# Patient Record
Sex: FEMALE | Race: BLACK OR AFRICAN AMERICAN | NOT HISPANIC OR LATINO | Employment: FULL TIME | URBAN - METROPOLITAN AREA
[De-identification: names, ages, dates, MRNs, and addresses within clinical notes are randomized per-mention and may not be internally consistent; named-entity substitution may affect disease eponyms.]

---

## 2023-06-04 DIAGNOSIS — N63.15 BREAST LUMP ON RIGHT SIDE AT 9 O'CLOCK POSITION: Primary | ICD-10-CM

## 2023-06-04 DIAGNOSIS — R92.1 BREAST CALCIFICATIONS: ICD-10-CM

## 2023-06-04 NOTE — PROGRESS NOTES
[facility]  MD Fran García, NP-C    Chief Complaint:   Kathy Gu is a 52 y.o. female presenting today for  follow up on her breast issues.  She is past due for a  Mammogram  She reports no interval changes on her self-breast examination. She was last seen in July 2021    History of Present Illness:   Mrs. Gu first presented on October 18, 2017 with right breast pain. Her imaging was consistent with benign calcifications in the left breast and benign cystic changes in the right. She was lost to follow up from January 2019 until she presented back July 12, 2021 with right breast swelling. Her imaging remained normal. MD::: Ana Guardado NP.    Past Medical History:   Diagnosis Date    Breast lump on right side at 9 o'clock position 6/11/2023    Diffuse cystic mastopathy of breast, right 6/11/2023    Mammographic microcalcification 6/11/2023    Mastodynia of right breast 6/11/2023      No past surgical history on file.   No current outpatient medications on file.   Review of patient's allergies indicates:  Not on File   Social History     Tobacco Use    Smoking status: Not on file    Smokeless tobacco: Not on file   Substance Use Topics    Alcohol use: Not on file      No family history on file.     Review of Systems   Integumentary:  Negative for color change, rash, mole/lesion, breast mass, breast discharge and breast tenderness.   Breast: Negative for mass and tenderness     Physical Exam   Constitutional: She appears well-developed. She is cooperative.   HENT:   Head: Normocephalic.   Cardiovascular:  Normal rate and regular rhythm.            Pulmonary/Chest: She exhibits no tenderness and no bony tenderness. Right breast exhibits no mass, no nipple discharge, no skin change and no tenderness. Left breast exhibits no mass, no nipple discharge, no skin change and no tenderness.   Abdominal: Soft. Normal appearance.   Musculoskeletal:  Lymphadenopathy:      Upper Body:      Right upper body: No supraclavicular or axillary adenopathy.      Left upper body: No supraclavicular or axillary adenopathy.     Neurological: She is alert.   Skin: No rash noted.      MAMMOGRAM REPORT: She has some diffuse fibronodular tissue; there are no spiculated lesions, distortions or suspicious calcifications noted; NEM    NOTE:::We viewed her films together at today's visit.  We discussed the multiple views obtained and the important findings.  Even benign changes were mentioned and her questions were answered.  She knows that she may receive a formal letter or report from the Radiologist.  She is to contact us if she has questions.        Assessment/Plan  1. Breast lump on right side at 9 o'clock position  Assessment & Plan:  We reviewed our findings today and her questions were answered.  She understands that her imaging and exams have remained stable (and show nothing concerning).  She is comfortable being followed in a conservative fashion.      She understands the importance of monthly self-breast examination and knows to report any and all changes as they occur.        2. Diffuse cystic mastopathy of breast, right  Assessment & Plan:  We discussed our Fibrocystic Mastopathy Protocol in detail. She should take Vitamin E 800 IU everyday x 3 months or until non-tender then can stop Vitamin E vs. continue daily at 400 IU.  The use of ice packs or warms soaks to tender area of the breast may also be of some benefit.  If warm soaks help her tenderness - She can use Aspercreme (unless allergic to Aspirin) on the affected area.  Ibuprofen (if no contraindications) at 800 mg three times per day for 5 days can also relieve many symptoms associated with swollen or inflamed tissue.  She can repeat Ibuprofen for 5 days, but then should be off for 5 days as it may cause gastric upset.  It is a good idea to wear a tight bra during the day and night to minimize movement of the  tender area (Sports Bras work well).  Evening Primrose Oil can be bought over the counter and used at a dose of 3000 mg per day to help with any breast pain/tenderness not improved by implementing the above measures.        3. Mammographic microcalcification  Assessment & Plan:  Her imaging and exams have remained stable      4. Mastodynia of right breast  Assessment & Plan:  We discussed our fibrocystic mastopathy protocol in detail. She knows that if she follows this protocol - that her symptoms should improve.  We discussed how breast pain is usually not associated with breast cancer, however, pain can be the presenting symptom with some cancers (but this could be coincidental). Still, if her pain does not improve in 8-12 weeks she should call us back for additional recommendations.               Medical Decision Making:  It is my impression that this patient suffers all conditions contained in this medical document.  Each of these conditions did affect our plan of care and my medical decision making today.  It is my opinion that the medical decision making concerning this patient was of moderate difficulty based on the aforementioned conditions.  Any further recommendations will be communicated to the patient by me.  I have reviewed and verified her allergies, list of medications, medical and surgical histories, social history, and a pertinent review of symptoms.      Follow up:  1 year and prn    For:  MARKOS SINGER) at

## 2023-06-11 PROBLEM — N63.15 BREAST LUMP ON RIGHT SIDE AT 9 O'CLOCK POSITION: Status: ACTIVE | Noted: 2023-06-11

## 2023-06-11 PROBLEM — N64.4 MASTODYNIA OF RIGHT BREAST: Status: ACTIVE | Noted: 2023-06-11

## 2023-06-11 PROBLEM — N60.11: Status: ACTIVE | Noted: 2023-06-11

## 2023-06-11 PROBLEM — R92.0 MAMMOGRAPHIC MICROCALCIFICATION: Status: ACTIVE | Noted: 2023-06-11

## 2023-06-12 ENCOUNTER — OFFICE VISIT (OUTPATIENT)
Dept: SURGERY | Facility: CLINIC | Age: 53
End: 2023-06-12
Payer: COMMERCIAL

## 2023-06-12 VITALS — BODY MASS INDEX: 46.13 KG/M2 | HEIGHT: 60 IN | WEIGHT: 235 LBS

## 2023-06-12 DIAGNOSIS — R92.0 MAMMOGRAPHIC MICROCALCIFICATION: ICD-10-CM

## 2023-06-12 DIAGNOSIS — N63.15 BREAST LUMP ON RIGHT SIDE AT 9 O'CLOCK POSITION: ICD-10-CM

## 2023-06-12 DIAGNOSIS — N60.11 DIFFUSE CYSTIC MASTOPATHY OF BREAST, RIGHT: ICD-10-CM

## 2023-06-12 DIAGNOSIS — N64.4 MASTODYNIA OF RIGHT BREAST: ICD-10-CM

## 2023-06-12 PROCEDURE — 99213 PR OFFICE/OUTPT VISIT, EST, LEVL III, 20-29 MIN: ICD-10-PCS | Mod: S$GLB,,, | Performed by: NURSE PRACTITIONER

## 2023-06-12 PROCEDURE — 3008F BODY MASS INDEX DOCD: CPT | Mod: CPTII,S$GLB,, | Performed by: NURSE PRACTITIONER

## 2023-06-12 PROCEDURE — 1159F MED LIST DOCD IN RCRD: CPT | Mod: CPTII,S$GLB,, | Performed by: NURSE PRACTITIONER

## 2023-06-12 PROCEDURE — 1160F PR REVIEW ALL MEDS BY PRESCRIBER/CLIN PHARMACIST DOCUMENTED: ICD-10-PCS | Mod: CPTII,S$GLB,, | Performed by: NURSE PRACTITIONER

## 2023-06-12 PROCEDURE — 3008F PR BODY MASS INDEX (BMI) DOCUMENTED: ICD-10-PCS | Mod: CPTII,S$GLB,, | Performed by: NURSE PRACTITIONER

## 2023-06-12 PROCEDURE — 1159F PR MEDICATION LIST DOCUMENTED IN MEDICAL RECORD: ICD-10-PCS | Mod: CPTII,S$GLB,, | Performed by: NURSE PRACTITIONER

## 2023-06-12 PROCEDURE — 99213 OFFICE O/P EST LOW 20 MIN: CPT | Mod: S$GLB,,, | Performed by: NURSE PRACTITIONER

## 2023-06-12 PROCEDURE — 1160F RVW MEDS BY RX/DR IN RCRD: CPT | Mod: CPTII,S$GLB,, | Performed by: NURSE PRACTITIONER

## 2024-05-22 DIAGNOSIS — R92.0 MAMMOGRAPHIC MICROCALCIFICATION: Primary | ICD-10-CM

## 2024-05-22 NOTE — PROGRESS NOTES
Date of Service: 6/12/2024      Chief Complaint:   Kathy Gu is a 53 y.o. female presenting today for her annual evaluation.  She is due for a mammogram. She reports no interval changes.     History of Present Illness: Mrs. Gu first presented on October 18, 2017 with right breast pain. Her imaging was consistent with benign calcifications in the left breast and benign cystic changes in the right. She was lost to follow up from January 2019 until she presented back July 12, 2021 with right breast swelling. Her imaging remained normal. MD::: Ana Guardado, AALIYAH.    Past Medical History:   Diagnosis Date    Breast lump on right side at 9 o'clock position 06/11/2023    Diffuse cystic mastopathy of breast, right 06/11/2023    Hypertension     Mammographic microcalcification 06/11/2023    Mastodynia of right breast 06/11/2023      Past Surgical History:   Procedure Laterality Date    CHOLECYSTECTOMY      knee scope Bilateral         Current Outpatient Medications:     amLODIPine (NORVASC) 5 MG tablet, Take 1 tablet by mouth once daily., Disp: , Rfl:     carvediloL (COREG) 25 MG tablet, Take 1 tablet by mouth 2 (two) times daily., Disp: , Rfl:     diclofenac (VOLTAREN) 75 MG EC tablet, TAKE 1 TABLET BY MOUTH TWICE DAILY AS NEEDED FOR PAIN TAKE WITH FOOD, Disp: , Rfl:     lisinopriL-hydrochlorothiazide (PRINZIDE,ZESTORETIC) 20-25 mg Tab, TAKE 1 TABLET BY MOUTH EVERY DAY FOR BLOOD PRESSURE AND FLUID, Disp: , Rfl:    Review of patient's allergies indicates:  No Known Allergies   Social History     Tobacco Use    Smoking status: Never    Smokeless tobacco: Never   Substance Use Topics    Alcohol use: Not Currently      Family History   Problem Relation Name Age of Onset    Breast cancer Neg Hx      Ovarian cancer Neg Hx          Review of Systems   Integumentary:  Negative for color change, rash, mole/lesion, thickening/swelling, dimpling of skin, drainage  Breast: Negative for mass and tenderness      Physical Exam   Constitutional: She appears well-developed. She is cooperative.   HENT: Normocephalic.   Cardiovascular:  Normal rate and regular rhythm.            Pulmonary/Chest: She exhibits no tenderness and no bony tenderness.   Abdominal: Soft. Normal appearance.   Musculoskeletal: Intact with no deficits  Neurological: She is alert.   Skin: No rash noted.   Breast and Chest Wall Evaluation:   Right breast exhibits no mass, no nipple discharge, no skin change and no tenderness.     Left breast exhibits no mass, no nipple discharge, no skin change and no tenderness.     Lymphadenopathy: No supraclavicular or axillary adenopathy.    MAMMOGRAM REPORT: She has some diffuse fibronodular tissue; there are no spiculated lesions, distortions or suspicious calcifications noted; NEM    ASSESSMENT and PLAN OF CARE     1. Breast lump on right side at 9 o'clock position  Assessment & Plan:  We reviewed our findings today and her questions were answered.  She understands that her imaging and exams have remained stable (and show nothing concerning).  She is comfortable being followed in a conservative fashion.      She understands the importance of monthly self-breast examination and knows to report any and all changes as they occur.    NOTE:::We viewed her films together at today's visit.  We discussed the multiple views obtained and the important findings.  Even benign changes were mentioned and her questions were answered.  She knows that she may receive a formal letter or report from the Radiologist.  She is to contact us if she has questions.         2. Diffuse cystic mastopathy of breast, right  Assessment & Plan:  We discussed our fibrocystic mastopathy protocol in detail. She knows that if she follows this protocol - that her symptoms should improve.  We discussed how breast pain is usually not associated with breast cancer, however, pain can be the presenting symptom with some cancers (but this could be  coincidental). Still, if her pain does not improve in 8-12 weeks she should call us back for additional recommendations.        3. Mammographic microcalcification  Assessment & Plan:  We reviewed our findings today and her questions were answered.  She understands that her imaging and exams have remained stable (and show nothing concerning). She has developed no new areas of suspicious calcifications bilaterally.  She knows that any new or changed calcifications in the future may necessitate the recommendation for tissue sampling. She is comfortable being followed in a conservative fashion.      She understands the importance of monthly self-breast examination and knows to report any and all changes as they occur.        Medical Decision Making: It is my impression that this patient suffers all conditions contained in this medical document.  Each of these conditions did affect our plan of care and my medical decision making today.  It is my opinion that the medical decision making concerning this patient was of minimal  difficulty based on the aforementioned conditions.  Any further recommendations will be communicated to the patient by me.  I have reviewed and verified her allergies, list of medications, medical and surgical histories, social history, and a pertinent review of symptoms.     Follow up: 1 year and PRN    For: Physical Examination and MGM (D) at

## 2024-06-12 ENCOUNTER — OFFICE VISIT (OUTPATIENT)
Dept: SURGERY | Facility: CLINIC | Age: 54
End: 2024-06-12
Payer: COMMERCIAL

## 2024-06-12 DIAGNOSIS — N63.15 BREAST LUMP ON RIGHT SIDE AT 9 O'CLOCK POSITION: Primary | ICD-10-CM

## 2024-06-12 DIAGNOSIS — R92.0 MAMMOGRAPHIC MICROCALCIFICATION: ICD-10-CM

## 2024-06-12 DIAGNOSIS — N60.11 DIFFUSE CYSTIC MASTOPATHY OF BREAST, RIGHT: ICD-10-CM

## 2024-06-12 PROCEDURE — 4010F ACE/ARB THERAPY RXD/TAKEN: CPT | Mod: CPTII,S$GLB,, | Performed by: SPECIALIST

## 2024-06-12 PROCEDURE — 1159F MED LIST DOCD IN RCRD: CPT | Mod: CPTII,S$GLB,, | Performed by: SPECIALIST

## 2024-06-12 PROCEDURE — 99999 PR PBB SHADOW E&M-EST. PATIENT-LVL II: CPT | Mod: PBBFAC,,, | Performed by: SPECIALIST

## 2024-06-12 PROCEDURE — 99213 OFFICE O/P EST LOW 20 MIN: CPT | Mod: S$GLB,,, | Performed by: SPECIALIST
